# Patient Record
Sex: FEMALE | Race: BLACK OR AFRICAN AMERICAN | NOT HISPANIC OR LATINO | URBAN - METROPOLITAN AREA
[De-identification: names, ages, dates, MRNs, and addresses within clinical notes are randomized per-mention and may not be internally consistent; named-entity substitution may affect disease eponyms.]

---

## 2023-03-07 ENCOUNTER — APPOINTMENT (OUTPATIENT)
Dept: LAB | Facility: LAB | Age: 53
End: 2023-03-07
Payer: COMMERCIAL

## 2023-03-10 LAB
NIL(NEG) CONTROL SPOT COUNT: NORMAL
PANEL A SPOT COUNT: 0
PANEL B SPOT COUNT: 2
POS CONTROL SPOT COUNT: NORMAL
T-SPOT. TB INTERPRETATION: NEGATIVE

## 2024-10-15 ENCOUNTER — HOSPITAL ENCOUNTER (EMERGENCY)
Facility: HOSPITAL | Age: 54
Discharge: HOME | End: 2024-10-15
Payer: COMMERCIAL

## 2024-10-15 ENCOUNTER — APPOINTMENT (OUTPATIENT)
Dept: RADIOLOGY | Facility: HOSPITAL | Age: 54
End: 2024-10-15
Payer: COMMERCIAL

## 2024-10-15 VITALS
BODY MASS INDEX: 28.83 KG/M2 | SYSTOLIC BLOOD PRESSURE: 147 MMHG | OXYGEN SATURATION: 98 % | HEIGHT: 59 IN | WEIGHT: 143 LBS | RESPIRATION RATE: 16 BRPM | HEART RATE: 77 BPM | TEMPERATURE: 98.6 F | DIASTOLIC BLOOD PRESSURE: 92 MMHG

## 2024-10-15 DIAGNOSIS — S06.0X0A CONCUSSION WITHOUT LOSS OF CONSCIOUSNESS, INITIAL ENCOUNTER: Primary | ICD-10-CM

## 2024-10-15 PROCEDURE — 70450 CT HEAD/BRAIN W/O DYE: CPT | Performed by: RADIOLOGY

## 2024-10-15 PROCEDURE — 72125 CT NECK SPINE W/O DYE: CPT | Performed by: RADIOLOGY

## 2024-10-15 PROCEDURE — 99285 EMERGENCY DEPT VISIT HI MDM: CPT

## 2024-10-15 PROCEDURE — 72125 CT NECK SPINE W/O DYE: CPT

## 2024-10-15 PROCEDURE — 70450 CT HEAD/BRAIN W/O DYE: CPT

## 2024-10-15 ASSESSMENT — PAIN DESCRIPTION - LOCATION: LOCATION: HEAD

## 2024-10-15 ASSESSMENT — COLUMBIA-SUICIDE SEVERITY RATING SCALE - C-SSRS
6. HAVE YOU EVER DONE ANYTHING, STARTED TO DO ANYTHING, OR PREPARED TO DO ANYTHING TO END YOUR LIFE?: NO
2. HAVE YOU ACTUALLY HAD ANY THOUGHTS OF KILLING YOURSELF?: NO
1. IN THE PAST MONTH, HAVE YOU WISHED YOU WERE DEAD OR WISHED YOU COULD GO TO SLEEP AND NOT WAKE UP?: NO

## 2024-10-15 ASSESSMENT — PAIN - FUNCTIONAL ASSESSMENT: PAIN_FUNCTIONAL_ASSESSMENT: 0-10

## 2024-10-15 ASSESSMENT — PAIN SCALES - GENERAL: PAINLEVEL_OUTOF10: 6

## 2024-10-15 ASSESSMENT — PAIN DESCRIPTION - PAIN TYPE: TYPE: ACUTE PAIN

## 2024-10-15 NOTE — ED TRIAGE NOTES
Patient fell 2 days ago, in [place], patient fell in a hotel, denies LOC, has headache, and some pain in face, denies cp/nv

## 2024-10-15 NOTE — ED PROVIDER NOTES
HPI   Chief Complaint   Patient presents with    Fall     Fall 2 days ago denies loc       History of present illness:  An otherwise healthy 54 year old female  presents to the ED due to a fall from last Saturday. Patient says her and her family were working out in a gym when she lost her balance and fell backwards and hit her head on a piece of metal equipment. Patient denies loss of consciousness but did say she felt dazed after the initial encounter and had blurry vision, which resolved. Patient iced and used ibuprofen which helped the head pain. Sunday night patient started experiencing R face numbness and sharp pains in her temples, her crown of her head, as well as where she initially hit her head.  Pain is currently 4 out of 10.  Last night, patient noticed she had L leg and bilateral foot tingling, as well as some blurry vision. She contacted her PCP and they advised her to come to the ED. denies injury to chest abdomen back or extremities, with no pain in these locations.  Patient denies any chest pain, shortness of breathe or issues with swallowing food or drink. Patient denies nausea or vomiting difficulty ambulating or incontinence. Patient denies any aggravating features, such as, light or sound sensitivity.     Review of systems: Constitutional, eye, ENT, cardiovascular, respiratory, gastrointestinal, genitourinary, neurologic, musculoskeletal, dermatologic, hematologic, endocrine systems were evaluated and were negative unless otherwise specified in history of present illness.    Medications: Reviewed and per nursing note.    Family history: Denies relevant medical conditions.    Past medical history: None per patient    Social history: Denies tobacco, alcohol, drug use.        Physical exam:    Appearance: Well-developed, well-nourished, nontoxic-appearing, alert and oriented x3. Talking in complete sentences.    HEENT: Slight tenderness bitemporal scalp region.  Head normocephalic atraumatic,  extraocular movements intact, pupils equal round reactive to light, mucous membranes are moist and pink.  No hemotympanum.    NECK:  Nml Inspection, no meningismus, no thyromegaly, no lymphadenopathy, no JVD, trachea is midline.    Respiratory: Clear to auscultation bilaterally with normal bilateral excursion. No wheezes, rhonchi, crackles.    Cardiovascular: Regular rate and rhythm, no murmurs, rubs or gallops. Pulses 2+ symmetrically in the dorsalis pedis and radial pulses.    Abdomen/GI:  Soft, nontender, nondistended, normal bowel sounds x4. No masses or organomegaly.    :  No CVA tenderness    Neuro:  Oriented x 3, Speech Clear, cranial nerves grossly intact. Normal sensation to light touch in all 4 extremities.    Musculoskeletal: Patient spontaneously moves all 4 extremities.    Skin:  No cyanosis, clubbing, edema, open wounds, or rashes.            Patient History   Past Medical History:   Diagnosis Date    Allergic contact dermatitis due to plants, except food 09/01/2014    Poison ivy dermatitis    Allergy status to unspecified drugs, medicaments and biological substances 09/01/2014    History of allergic reaction    Encounter for gynecological examination (general) (routine) without abnormal findings     Encounter for gynecological examination without abnormal finding    Encounter for screening for respiratory tuberculosis 11/22/2013    Screening examination for pulmonary tuberculosis    Olecranon bursitis, right elbow 04/01/2014    Olecranon bursitis of right elbow    Pain in unspecified shoulder 04/01/2014    Pain, joint, shoulder    Paresthesia of skin 04/01/2014    Paresthesia    Personal history of (healed) traumatic fracture 06/03/2014    History of fracture of toe    Personal history of other specified conditions 03/04/2013    History of shortness of breath    Toxic effect of venom of bees, accidental (unintentional), initial encounter 09/01/2014    Bee sting    Unspecified injury of unspecified  foot, initial encounter 06/03/2014    Toe injury     Past Surgical History:   Procedure Laterality Date    TUBAL LIGATION  03/03/2013    Tubal Ligation     No family history on file.  Social History     Tobacco Use    Smoking status: Not on file    Smokeless tobacco: Not on file   Substance Use Topics    Alcohol use: Not on file    Drug use: Not on file       Physical Exam   ED Triage Vitals [10/15/24 0845]   Temperature Heart Rate Respirations BP   37 °C (98.6 °F) 77 16 (!) 147/92      Pulse Ox Temp src Heart Rate Source Patient Position   98 % -- -- --      BP Location FiO2 (%)     -- --       Physical Exam      ED Course & MDM   Diagnoses as of 10/15/24 1216   Concussion without loss of consciousness, initial encounter                 No data recorded     [place] Coma Scale Score: 15 (10/15/24 0845 : Jac Brar RN)                           Medical Decision Making  CT head wo IV contrast   Final Result    CT HEAD:    No acute intracranial abnormality or calvarial fracture.                CT CERVICAL SPINE:    No acute fracture or traumatic malalignment of the cervical spine.          Signed by: Collins Jeter 10/15/2024 11:10 AM    Dictation workstation:   [id number]     CT cervical spine wo IV contrast   Final Result    CT HEAD:    No acute intracranial abnormality or calvarial fracture.                CT CERVICAL SPINE:    No acute fracture or traumatic malalignment of the cervical spine.          Signed by: Collins Jeter 10/15/2024 11:10 AM    Dictation workstation:   [id number]         54-year-old female with head injury with associated paresthesias and intermittent blurred vision.  Differential diagnosis of concussion, scalp contusion, traumatic brain injury, intracranial hemorrhage, skull fracture, cervical spine injury.  Patient reports unintentional head injury with subsequent paresthesias and intermittent blurred vision.  Patient has no blurred vision currently.  Patient has subjective  paresthesias which are not evident on examination which are in her bilateral scalp and bilateral legs.  Patient has normal cranial nerve and peripheral nerve examination.    Given patient's persistent headache paresthesias and intermittent blurred vision imaging ordered, CT head and cervical spine.  Declined pain medication at the moment.    Imaging shows no acute findings.  Presentation consistent with concussion and contusion.  Patient has ibuprofen at home which she intends to continue taking as needed.    Patient will be discharged to home with prescription.  Patient is educated in signs and symptoms of worsening symptoms and reasons to come back to the emergency department.  Will need to follow up with primary care provider.  Patient does not report social determinants of health impacting ability to obtain care that is needed.  Patient agrees with plan.    This is a transcription.  Text was reviewed for errors, but some transcription errors may remain.  Please call for any questions.          Procedure  Procedures     Aristides Guerra PA-C  10/15/24 2093

## 2024-10-23 ENCOUNTER — OFFICE VISIT (OUTPATIENT)
Dept: PRIMARY CARE | Facility: CLINIC | Age: 54
End: 2024-10-23
Payer: COMMERCIAL

## 2024-10-23 VITALS
HEART RATE: 77 BPM | HEIGHT: 59 IN | BODY MASS INDEX: 28.83 KG/M2 | WEIGHT: 143 LBS | DIASTOLIC BLOOD PRESSURE: 90 MMHG | SYSTOLIC BLOOD PRESSURE: 147 MMHG

## 2024-10-23 DIAGNOSIS — S06.0X0A CONCUSSION WITHOUT LOSS OF CONSCIOUSNESS, INITIAL ENCOUNTER: Primary | ICD-10-CM

## 2024-10-23 PROCEDURE — 99203 OFFICE O/P NEW LOW 30 MIN: CPT | Performed by: FAMILY MEDICINE

## 2024-10-23 PROCEDURE — 3008F BODY MASS INDEX DOCD: CPT | Performed by: FAMILY MEDICINE

## 2024-10-23 ASSESSMENT — ENCOUNTER SYMPTOMS
OCCASIONAL FEELINGS OF UNSTEADINESS: 0
LOSS OF SENSATION IN FEET: 0
DEPRESSION: 0

## 2024-10-23 NOTE — LETTER
October 23, 2024     Patient: Yamel Guadarrama   YOB: 1970   Date of Visit: 10/23/2024       To Whom It May Concern:    Yamel Guadarrama was seen in my clinic on 10/23/2024 at 4:30 pm. Please excuse Yamel for her absence from work on this day to make the appointment.    Was seen in concussion clinic. She is recovering. I recommend no RTW until further clearance. She will follow up in about 10 days.     If you have any questions or concerns, please don't hesitate to call.         Sincerely,         Hong Friedman, DO        CC: No Recipients

## 2024-10-23 NOTE — PROGRESS NOTES
Pt is here for concussion     Yamel ED Guadarrama is a 54-year-old female presenting to concussion clinic for evaluation    Sat Oct 12, 2024- she was in [place] hotel. She went to the hotel gym with her  and 2 adult children. She was using a machine and she tried to step off the equipment and she fell backwards. There was a machine behind her and the back of her head hit a bar on that other machine. No LOC. She had some tears at the time.   She went back to the hotel room and she iced her head.  Oct 13, 2024- she checked out of the hotel and they started to drive home and stopped to shop, but she wasn't feeling like herself.   She's had a headache since then.     Oct 15, 2024- she went to [facility] ER and had a negative CT head.     Oct 17, 2024- saw her PCP office.      Healthcare team:  - PCP- [facility] Oct 31, 2024 appt      Headache: constant, seems located in her sinus area and forehead, but she doesn't have sinus symptoms.   Foggy sensation.  Some pain in the back of her scalp from the head injury.         Work: [employer]. She had a 2 days off after her ER visit. Reportedly she doesn't qualify for FMLA. She is trying to work through her concussion symptoms.  School: none  Sports: - is still able to do that verbally since the concussion.   Paperwork: no injury report. No police. No EMS. No attorneys.       Date of current head injury:   - Oct 12, 2024  Previous concussion history:  - none  Imaging for a head injury/concussion:  - Oct 15, 2024- unremarkable CT head.   Depression, anxiety, psychiatric disorder, learning disability, dyslexia, ADD/ADHD?:  - none  Headache history:   - every now and then, about 4 years ago she had a headache for 14 days in a row      Concussion symptoms: severity 0 to 6    Headache 3  Pressure in head 2  Neck pain 0  Nausea or vomiting 0  Dizziness 1  Blurred vision 2  Balance problems 0  Sensitivity to light 1  Sensitivity to noise  1  Feeling slowed down 4  Feeling like in a fog 4  Don't feel right 4  Difficulty concentrating 3  Difficulty remembering 0  Fatigue or low energy 2  Confusion 1  Drowsiness 3  More emotional 0  Irritability 1  Sadness 0  Nervous or Anxious 2  Trouble falling asleep 1    Visit Vitals  /90   Pulse 77        Gen: NAD, Alert and oriented x3  Head: posterior scalp ttp; no step offs  Face: no specific areas of ttp; no step offs  Neck: no posterior ttp.         Psych: mood pleasant and appropriate  Resp: good respiratory effort  Neurologic: CN II-XII grossly intact. Strength and sensation symmetric and intact throughout all 4 extremities. DTR 2+ in all 4 extremities. Cerebellar testing normal. Negative Rhomberg's test. No dysdiadochokinesis.  Gait: normal    CT head wo IV contrast, CT cervical spine wo IV contrast  Narrative: Interpreted By:  Collins Jeter,   STUDY:  CT HEAD WO IV CONTRAST; CT CERVICAL SPINE WO IV CONTRAST;  10/15/2024  10:15 am      INDICATION:  Signs/Symptoms:head injury, blurred vision, paresthesias;  Signs/Symptoms:injury, pain, paresthesias      COMPARISON:  None.      ACCESSION NUMBER(S):  [id number]; [id number]      ORDERING CLINICIAN:  ALONA RICO      TECHNIQUE:  Axial noncontrast CT images of head with coronal and sagittal  reconstructed images. Axial noncontrast CT images of the cervical  spine with coronal and sagittal reconstructed images.      FINDINGS:  CT HEAD:      BRAIN PARENCHYMA:  No evidence of acute intraparenchymal hemorrhage  or parenchymal evidence of acute large territory ischemic infarct. No  mass-effect, midline shift or effacement of cerebral sulci.  Gray-white matter distinction is preserved.      VENTRICLES and EXTRA-AXIAL SPACES:  No acute extra-axial or  intraventricular hemorrhage. Ventricles and sulci are age-concordant.      PARANASAL SINUSES/MASTOIDS:  No hemorrhage or air-fluid levels within  the visualized paranasal sinuses. The mastoid air cells  are  well-aerated.      CALVARIUM/ORBITS:  No skull fracture.  The orbits and globes are  intact to the extent visualized.      EXTRACRANIAL SOFT TISSUES: No discernible abnormality.          CT CERVICAL SPINE:      PREVERTEBRAL SOFT TISSUES: Within normal limits.      CRANIOCERVICAL JUNCTION: Intact.      ALIGNMENT:  No traumatic malalignment or traumatic facet widening.  Loss of normal cervical lordosis presumably related to muscle spasm  or patient position.      VERTEBRAE:  No acute fracture. Vertebral body heights are maintained.      Mild degenerative changes.      Mild heterogeneity of the thyroid gland      Impression: CT HEAD:  No acute intracranial abnormality or calvarial fracture.          CT CERVICAL SPINE:  No acute fracture or traumatic malalignment of the cervical spine.      Signed by: Collins Jeter 10/15/2024 11:10 AM  Dictation workstation:   [id number]      No MRI head results found for the past 12 months     CT head wo IV contrast    Result Date: 10/15/2024  Interpreted By:  Collins Jeter, STUDY: CT HEAD WO IV CONTRAST; CT CERVICAL SPINE WO IV CONTRAST;  10/15/2024 10:15 am   INDICATION: Signs/Symptoms:head injury, blurred vision, paresthesias; Signs/Symptoms:injury, pain, paresthesias   COMPARISON: None.   ACCESSION NUMBER(S): [id number]; [id number]   ORDERING CLINICIAN: ALONA RICO   TECHNIQUE: Axial noncontrast CT images of head with coronal and sagittal reconstructed images. Axial noncontrast CT images of the cervical spine with coronal and sagittal reconstructed images.   FINDINGS: CT HEAD:   BRAIN PARENCHYMA:  No evidence of acute intraparenchymal hemorrhage or parenchymal evidence of acute large territory ischemic infarct. No mass-effect, midline shift or effacement of cerebral sulci. Gray-white matter distinction is preserved.   VENTRICLES and EXTRA-AXIAL SPACES:  No acute extra-axial or intraventricular hemorrhage. Ventricles and sulci are age-concordant.   PARANASAL  SINUSES/MASTOIDS:  No hemorrhage or air-fluid levels within the visualized paranasal sinuses. The mastoid air cells are well-aerated.   CALVARIUM/ORBITS:  No skull fracture.  The orbits and globes are intact to the extent visualized.   EXTRACRANIAL SOFT TISSUES: No discernible abnormality.     CT CERVICAL SPINE:   PREVERTEBRAL SOFT TISSUES: Within normal limits.   CRANIOCERVICAL JUNCTION: Intact.   ALIGNMENT:  No traumatic malalignment or traumatic facet widening. Loss of normal cervical lordosis presumably related to muscle spasm or patient position.   VERTEBRAE:  No acute fracture. Vertebral body heights are maintained.   Mild degenerative changes.   Mild heterogeneity of the thyroid gland       CT HEAD: No acute intracranial abnormality or calvarial fracture.     CT CERVICAL SPINE: No acute fracture or traumatic malalignment of the cervical spine.   Signed by: Collins Jeter 10/15/2024 11:10 AM Dictation workstation:   [id number]          1. Concussion without loss of consciousness, initial encounter                 New concussion visit.    You have suffered a concussion which is an injury to the brain that takes a variable amount of time to recover.   Relative rest is the best treatment, but physical activity that does not worsen your symptoms can be an important part of recovery.     Your concussion symptoms are mild to moderate.     Your condition may transition to post concussion syndrome    Your recovery may be slowed by the risk factors we discussed.    I recommend limiting screen time. No more than 2 hours/day of total screen time is a reasonable amount.      Tylenol or ibuprofen are ok for headaches.      Nurtec is an expensive, new migraine headache medication taken as a tablet that dissolves under the tongue. It works by blocking a headache (migraine) receptor.   - you may have some old pills at home.     No strenuous physical activity for the time being, such as heavy lifting or intense exercise.      You should avoid activities that place you at risk for sustaining another head injury.    Work note printed.    Work modifications include: hopefully at your follow up we can gradually start a RTW plan.     I can complete [facility] paperwork and fax it to the number you provide to the office.     Follow up with Dr. Friedman: about 10 days.     If you are not improving by the time you follow up- we will consider a referral to physical therapy.    If physical therapy and rest do not help you fully recover, another step in the management of your head injury may be a referral to neuropsychology.

## 2024-10-24 ENCOUNTER — APPOINTMENT (OUTPATIENT)
Dept: PRIMARY CARE | Facility: CLINIC | Age: 54
End: 2024-10-24
Payer: COMMERCIAL

## 2024-10-30 ENCOUNTER — APPOINTMENT (OUTPATIENT)
Dept: PRIMARY CARE | Facility: CLINIC | Age: 54
End: 2024-10-30
Payer: COMMERCIAL

## 2024-11-04 ENCOUNTER — APPOINTMENT (OUTPATIENT)
Dept: PRIMARY CARE | Facility: CLINIC | Age: 54
End: 2024-11-04
Payer: COMMERCIAL

## 2024-11-20 ENCOUNTER — APPOINTMENT (OUTPATIENT)
Dept: PRIMARY CARE | Facility: CLINIC | Age: 54
End: 2024-11-20
Payer: COMMERCIAL